# Patient Record
Sex: FEMALE | Race: WHITE | URBAN - METROPOLITAN AREA
[De-identification: names, ages, dates, MRNs, and addresses within clinical notes are randomized per-mention and may not be internally consistent; named-entity substitution may affect disease eponyms.]

---

## 2017-04-17 ENCOUNTER — HOSPITAL ENCOUNTER (OUTPATIENT)
Facility: CLINIC | Age: 72
Discharge: HOME | End: 2017-04-17
Attending: OPHTHALMOLOGY | Admitting: OPHTHALMOLOGY
Payer: MEDICARE

## 2017-04-17 ENCOUNTER — ANESTHESIA EVENT (OUTPATIENT)
Dept: SURGERY | Facility: CLINIC | Age: 72
End: 2017-04-17
Payer: MEDICARE

## 2017-04-17 ENCOUNTER — ANESTHESIA (OUTPATIENT)
Dept: SURGERY | Facility: CLINIC | Age: 72
End: 2017-04-17
Payer: MEDICARE

## 2017-04-17 VITALS
RESPIRATION RATE: 14 BRPM | DIASTOLIC BLOOD PRESSURE: 65 MMHG | WEIGHT: 165 LBS | HEIGHT: 65 IN | OXYGEN SATURATION: 99 % | SYSTOLIC BLOOD PRESSURE: 108 MMHG | BODY MASS INDEX: 27.49 KG/M2 | TEMPERATURE: 98.1 F

## 2017-04-17 DIAGNOSIS — H02.834 DERMATOCHALASIS OF BOTH UPPER EYELIDS: Primary | ICD-10-CM

## 2017-04-17 DIAGNOSIS — H02.831 DERMATOCHALASIS OF BOTH UPPER EYELIDS: Primary | ICD-10-CM

## 2017-04-17 PROCEDURE — 71000028 ZZH EYE RECOVERY PHASE 2 EACH 15 MINS: Performed by: OPHTHALMOLOGY

## 2017-04-17 PROCEDURE — 37000009 ZZH ANESTHESIA TECHNICAL FEE, EACH ADDTL 15 MIN: Performed by: OPHTHALMOLOGY

## 2017-04-17 PROCEDURE — 25000125 ZZHC RX 250: Performed by: OPHTHALMOLOGY

## 2017-04-17 PROCEDURE — 25000128 H RX IP 250 OP 636: Performed by: NURSE ANESTHETIST, CERTIFIED REGISTERED

## 2017-04-17 PROCEDURE — 36000101 ZZH EYE SURGERY LEVEL 3 1ST 30 MIN: Performed by: OPHTHALMOLOGY

## 2017-04-17 PROCEDURE — 36000102 ZZH EYE SURGERY LEVEL 3 EA 15 ADDTL MIN: Performed by: OPHTHALMOLOGY

## 2017-04-17 PROCEDURE — 25800025 ZZH RX 258: Performed by: ANESTHESIOLOGY

## 2017-04-17 PROCEDURE — 40000170 ZZH STATISTIC PRE-PROCEDURE ASSESSMENT II: Performed by: OPHTHALMOLOGY

## 2017-04-17 PROCEDURE — 27210794 ZZH OR GENERAL SUPPLY STERILE: Performed by: OPHTHALMOLOGY

## 2017-04-17 PROCEDURE — 37000008 ZZH ANESTHESIA TECHNICAL FEE, 1ST 30 MIN: Performed by: OPHTHALMOLOGY

## 2017-04-17 PROCEDURE — 25000125 ZZHC RX 250: Performed by: ANESTHESIOLOGY

## 2017-04-17 PROCEDURE — 25000132 ZZH RX MED GY IP 250 OP 250 PS 637: Mod: GY | Performed by: OPHTHALMOLOGY

## 2017-04-17 PROCEDURE — 25000125 ZZHC RX 250: Performed by: NURSE ANESTHETIST, CERTIFIED REGISTERED

## 2017-04-17 RX ORDER — TETRACAINE HYDROCHLORIDE 5 MG/ML
SOLUTION OPHTHALMIC PRN
Status: DISCONTINUED | OUTPATIENT
Start: 2017-04-17 | End: 2017-04-17 | Stop reason: HOSPADM

## 2017-04-17 RX ORDER — ONDANSETRON 2 MG/ML
INJECTION INTRAMUSCULAR; INTRAVENOUS PRN
Status: DISCONTINUED | OUTPATIENT
Start: 2017-04-17 | End: 2017-04-17

## 2017-04-17 RX ORDER — HYDROCODONE BITARTRATE AND ACETAMINOPHEN 5; 325 MG/1; MG/1
1 TABLET ORAL EVERY 6 HOURS PRN
Qty: 15 TABLET | Refills: 0 | Status: SHIPPED | OUTPATIENT
Start: 2017-04-17

## 2017-04-17 RX ORDER — ERYTHROMYCIN 5 MG/G
OINTMENT OPHTHALMIC PRN
Status: DISCONTINUED | OUTPATIENT
Start: 2017-04-17 | End: 2017-04-17 | Stop reason: HOSPADM

## 2017-04-17 RX ORDER — FENTANYL CITRATE 50 UG/ML
INJECTION, SOLUTION INTRAMUSCULAR; INTRAVENOUS PRN
Status: DISCONTINUED | OUTPATIENT
Start: 2017-04-17 | End: 2017-04-17

## 2017-04-17 RX ORDER — PROPOFOL 10 MG/ML
INJECTION, EMULSION INTRAVENOUS PRN
Status: DISCONTINUED | OUTPATIENT
Start: 2017-04-17 | End: 2017-04-17

## 2017-04-17 RX ORDER — ERYTHROMYCIN 5 MG/G
OINTMENT OPHTHALMIC
Qty: 1 G | Refills: 1 | Status: SHIPPED | OUTPATIENT
Start: 2017-04-17

## 2017-04-17 RX ORDER — SODIUM CHLORIDE, SODIUM LACTATE, POTASSIUM CHLORIDE, CALCIUM CHLORIDE 600; 310; 30; 20 MG/100ML; MG/100ML; MG/100ML; MG/100ML
INJECTION, SOLUTION INTRAVENOUS CONTINUOUS
Status: DISCONTINUED | OUTPATIENT
Start: 2017-04-17 | End: 2017-04-17 | Stop reason: HOSPADM

## 2017-04-17 RX ORDER — LIDOCAINE HYDROCHLORIDE 20 MG/ML
INJECTION, SOLUTION INFILTRATION; PERINEURAL PRN
Status: DISCONTINUED | OUTPATIENT
Start: 2017-04-17 | End: 2017-04-17

## 2017-04-17 RX ADMIN — FENTANYL CITRATE 25 MCG: 50 INJECTION, SOLUTION INTRAMUSCULAR; INTRAVENOUS at 10:06

## 2017-04-17 RX ADMIN — DEXMEDETOMIDINE 4 MCG: 100 INJECTION, SOLUTION, CONCENTRATE INTRAVENOUS at 10:11

## 2017-04-17 RX ADMIN — DEXMEDETOMIDINE 4 MCG: 100 INJECTION, SOLUTION, CONCENTRATE INTRAVENOUS at 10:06

## 2017-04-17 RX ADMIN — PROPOFOL 10 MG: 10 INJECTION, EMULSION INTRAVENOUS at 10:24

## 2017-04-17 RX ADMIN — PROPOFOL 20 MG: 10 INJECTION, EMULSION INTRAVENOUS at 10:22

## 2017-04-17 RX ADMIN — ONDANSETRON 4 MG: 2 INJECTION INTRAMUSCULAR; INTRAVENOUS at 10:09

## 2017-04-17 RX ADMIN — LIDOCAINE HYDROCHLORIDE 60 MG: 20 INJECTION, SOLUTION INFILTRATION; PERINEURAL at 10:21

## 2017-04-17 RX ADMIN — LIDOCAINE HYDROCHLORIDE 1 ML: 10 INJECTION, SOLUTION EPIDURAL; INFILTRATION; INTRACAUDAL; PERINEURAL at 09:24

## 2017-04-17 RX ADMIN — PROPOFOL 10 MG: 10 INJECTION, EMULSION INTRAVENOUS at 10:23

## 2017-04-17 RX ADMIN — PROPOFOL 30 MG: 10 INJECTION, EMULSION INTRAVENOUS at 10:21

## 2017-04-17 RX ADMIN — MIDAZOLAM HYDROCHLORIDE 1 MG: 1 INJECTION, SOLUTION INTRAMUSCULAR; INTRAVENOUS at 10:04

## 2017-04-17 RX ADMIN — SODIUM CHLORIDE, POTASSIUM CHLORIDE, SODIUM LACTATE AND CALCIUM CHLORIDE: 600; 310; 30; 20 INJECTION, SOLUTION INTRAVENOUS at 09:24

## 2017-04-17 ASSESSMENT — ENCOUNTER SYMPTOMS: SEIZURES: 0

## 2017-04-17 NOTE — IP AVS SNAPSHOT
[facility]    [address]    [place] [address]    Phone:  [phone]    Fax:  [phone]                                       After Visit Summary   4/17/2017    Aaliyah Vizcarra    MRN: [id number]           After Visit Summary Signature Page     I have received my discharge instructions, and my questions have been answered. I have discussed any challenges I see with this plan with the nurse or doctor.    ..........................................................................................................................................  Patient/Patient Representative Signature      ..........................................................................................................................................  Patient Representative Print Name and Relationship to Patient    ..................................................               ................................................  Date                                            Time    ..........................................................................................................................................  Reviewed by Signature/Title    ...................................................              ..............................................  Date                                                            Time

## 2017-04-17 NOTE — IP AVS SNAPSHOT
MRN:[id number]                      After Visit Summary   4/17/2017    Aaliyah Vizcarra    MRN: [id number]           Thank you!     Thank you for choosing [facility] for your care. Our goal is always to provide you with excellent care. Hearing back from our patients is one way we can continue to improve our services. Please take a few minutes to complete the written survey that you may receive in the mail after you visit with us. Thank you!        Patient Information     Date Of Birth          1945        About your hospital stay     You were admitted on:  April 17, 2017 You last received care in the:  [facility]    You were discharged on:  April 17, 2017       Who to Call     For medical emergencies, please call 911.  For non-urgent questions about your medical care, please call your primary care provider or clinic, [phone]  For questions related to your surgery, please call your surgery clinic        Attending Provider     Provider Specialty    Nish Tristan MD Ophthalmology       Primary Care Provider Office Phone # Fax #    [facility] [phone] [address] [facility] [place] [address]        Further instructions from your care team         POST-OPERATIVE CARE FOLLOWING EYELID SURGERY  DR. Nish Tristan  [facility]  [phone]  ICE COMPRESSES:  Immediately following surgery, you should begin to apply ice compresses.  A good method is to use a clean washcloth soaked in a bowl of ice water.  Wring it out and gently lay across your eyes.  Do this as much as you can today for 15 minutes each episode, but not while sleeping.  Then continue cold compresses 6x/day for the first four days after surgery, or longer if swelling persists.    OINTMENT:  Apply ERYTHROMYCIN OINTMENT to the surgical area (along the sutures if present) 3 TIMES PER DAY FOR 1 WEEK.  If you get ointment in your eye, it will blur your vision  slightly, but will not harm you eye.  If you run out of antibiotic ointment use Vaseline instead until the wounds are fully healed.  Tonight only 4/17, instill erythromycin ointment in your eye to lubricate your eyes  ACTIVITY:  You may resume regular activities as tolerated.  You may shower and wash your hair on the day after surgery, be careful to avoid getting shampoo in your eyes. Sleeping with your head elevated will help with swelling. You are allowed to bend.    MEDICATION:  Some discomfort and tenderness may be noticed around the eye.  If the Dr. has prescribed pain medication, please take it according to the directions on the bottle.  If you are not prescribed pain medication or you feel you do not need it, you may take Extra Strength Tylenol for mild pain. Wait after 24 hrs if you want to take Ibuprofen.  for severe pain take Norco as prescribed 1 tablet every 6 hrs.    WHAT TO EXPECT:  You should expect some slight oozing of blood from the incision site over the first two to three days after surgery.  Swelling and bruising will occur for one to two weeks or longer.  You may also experience itching and tearing during the first several weeks after surgery.  This is part of the normal healing process.  APPOINTMENT :  Make and appointment for next week   CALL THE DOCTOR S OFFICE WITH ANY QUESTIONS OR CONCERNS, [phone].  Please call if you suspect your wound has opened, you have a large / growing bruise (hematoma), vision loss not attributable to the ointment, or bleeding that does not seem to be stopping.    [facility] Discharge  Instructions  Anesthesia (Eye Care Center)   Adult Discharge Instructions    For 24 hours after surgery    1. Get plenty of rest.  Make arrangements to have a responsible adult stay with you for at least 6 hours after you leave the hospital.  2. Do not drive or use heavy equipment for 24 hours.    3. Do not drink alcohol for 24 hours.  4. Do  "not sign legal documents or make important decisions for 24 hours.  5. Avoid strenuous or risky activities. You may feel lightheaded.  If so, sit for a few minutes before standing.  Have someone help you get up.   6. Conscious sedation patients may resume a regular diet..  7. Any questions of medical nature, call your physician.      Pending Results     No orders found from 4/15/2017 to 2017.            Admission Information     Date & Time Provider Department Dept. Phone    2017 [place], Nish LANG MD [facility] [phone]      Your Vitals Were     Blood Pressure Temperature Respirations Height Weight Pulse Oximetry    112/75 98.1  F (36.7  C) (Temporal) 16 1.645 m (5' 4.76\") 74.8 kg (165 lb) 99%    BMI (Body Mass Index)                   27.66 kg/m2           MyChart Information     [employer] lets you send messages to your doctor, view your test results, renew your prescriptions, schedule appointments and more. To sign up, go to [email or web address] . Click on \"Log in\" on the left side of the screen, which will take you to the Welcome page. Then click on \"Sign up Now\" on the right side of the page.     You will be asked to enter the access code listed below, as well as some personal information. Please follow the directions to create your username and password.     Your access code is: [email or web address]  Expires: 2017 11:34 AM     Your access code will  in 90 days. If you need help or a new code, please call your [place] clinic or [phone].        Care EveryWhere ID     This is your Care EveryWhere ID. This could be used by other organizations to access your [place] medical records  [id number]           Review of your medicines      START taking        Dose / Directions    erythromycin ophthalmic ointment   Commonly known as:  ROMYCIN   Used for:  Dermatochalasis of both upper eyelids        Apply to operative eyelid(s) three times daily, in eye(s) at bedtime   Quantity: "  1 g   Refills:  1       HYDROcodone-acetaminophen 5-325 MG per tablet   Commonly known as:  NORCO   Used for:  Dermatochalasis of both upper eyelids        Dose:  1 tablet   Take 1 tablet by mouth every 6 hours as needed for moderate to severe pain   Quantity:  15 tablet   Refills:  0         CONTINUE these medicines which have NOT CHANGED        Dose / Directions    albuterol 108 (90 BASE) MCG/ACT Inhaler   Generic drug:  albuterol        Dose:  2 puff   Inhale 2 puffs into the lungs every 6 hours as needed.   Refills:  0       CALCIUM CARBONATE PO        Dose:  500 mg   Take 500 mg by mouth daily oystershell   Refills:  0       FLOVENT IN        Inhale  into the lungs.   Refills:  0       LISINOPRIL PO        Dose:  5 mg   Take 5 mg by mouth daily   Refills:  0       MULTIVITAMIN PO        Take  by mouth.   Refills:  0       ranitidine 150 MG capsule   Commonly known as:  ZANTAC        Dose:  150 mg   Take 150 mg by mouth 2 times daily   Refills:  0       VITAMIN D (CHOLECALCIFEROL) PO        Dose:  1000 Units   Take 1,000 Units by mouth daily   Refills:  0            Where to get your medicines      These medications were sent to [facility] - [address]  [address], [place] [address]     Phone:  [phone]     erythromycin ophthalmic ointment         Some of these will need a paper prescription and others can be bought over the counter. Ask your nurse if you have questions.     Bring a paper prescription for each of these medications     HYDROcodone-acetaminophen 5-325 MG per tablet                Protect others around you: Learn how to safely use, store and throw away your medicines at www.disposemymeds.org.             Medication List: This is a list of all your medications and when to take them. Check marks below indicate your daily home schedule. Keep this list as a reference.      Medications           Morning Afternoon Evening Bedtime As Needed     albuterol 108 (90 BASE) MCG/ACT Inhaler   Inhale 2 puffs into the lungs every 6 hours as needed.   Generic drug:  albuterol                                CALCIUM CARBONATE PO   Take 500 mg by mouth daily rita                                erythromycin ophthalmic ointment   Commonly known as:  ROMYCIN   Apply to operative eyelid(s) three times daily, in eye(s) at bedtime   Last time this was given:  1 g on 4/17/2017 11:36 AM                                FLOVENT IN   Inhale  into the lungs.                                HYDROcodone-acetaminophen 5-325 MG per tablet   Commonly known as:  NORCO   Take 1 tablet by mouth every 6 hours as needed for moderate to severe pain                                LISINOPRIL PO   Take 5 mg by mouth daily                                MULTIVITAMIN PO   Take  by mouth.                                ranitidine 150 MG capsule   Commonly known as:  ZANTAC   Take 150 mg by mouth 2 times daily                                VITAMIN D (CHOLECALCIFEROL) PO   Take 1,000 Units by mouth daily

## 2017-04-17 NOTE — ANESTHESIA POSTPROCEDURE EVALUATION
Patient: Aaliyah Vizcarra    Procedure(s):  BILATERAL BLEPHAROPLASTY WITH BROW PTOSIS REPAIR  - Wound Class: I-Clean    Diagnosis:PTOSIS   Diagnosis Additional Information: No value filed.    Anesthesia Type:  MAC    Note:  Anesthesia Post Evaluation    Patient location during evaluation: bedside  Patient participation: Able to fully participate in evaluation  Level of consciousness: awake  Pain management: adequate  Airway patency: patent  Cardiovascular status: acceptable  Respiratory status: acceptable  Hydration status: acceptable  PONV: none     Anesthetic complications: None    Comments: No anesthetic complications noted.         Last vitals:  Vitals:    04/17/17 0922 04/17/17 1138 04/17/17 1239   BP: 129/73 112/75 108/65   Resp: 16 16 14   Temp: 36.7  C (98.1  F)     SpO2: 99% 99% 99%         Electronically Signed By: Howie Blanco DO, DO  April 17, 2017  5:02 PM

## 2017-04-17 NOTE — DISCHARGE INSTRUCTIONS
POST-OPERATIVE CARE FOLLOWING EYELID SURGERY  DR. Nish Tristan  [facility]  [phone]  ICE COMPRESSES:  Immediately following surgery, you should begin to apply ice compresses.  A good method is to use a clean washcloth soaked in a bowl of ice water.  Wring it out and gently lay across your eyes.  Do this as much as you can today for 15 minutes each episode, but not while sleeping.  Then continue cold compresses 6x/day for the first four days after surgery, or longer if swelling persists.    OINTMENT:  Apply ERYTHROMYCIN OINTMENT to the surgical area (along the sutures if present) 3 TIMES PER DAY FOR 1 WEEK.  If you get ointment in your eye, it will blur your vision slightly, but will not harm you eye.  If you run out of antibiotic ointment use Vaseline instead until the wounds are fully healed.  Tonight only 4/17, instill erythromycin ointment in your eye to lubricate your eyes  ACTIVITY:  You may resume regular activities as tolerated.  You may shower and wash your hair on the day after surgery, be careful to avoid getting shampoo in your eyes. Sleeping with your head elevated will help with swelling. You are allowed to bend.    MEDICATION:  Some discomfort and tenderness may be noticed around the eye.  If the DrEthan has prescribed pain medication, please take it according to the directions on the bottle.  If you are not prescribed pain medication or you feel you do not need it, you may take Extra Strength Tylenol for mild pain. Wait after 24 hrs if you want to take Ibuprofen.  for severe pain take Norco as prescribed 1 tablet every 6 hrs.    WHAT TO EXPECT:  You should expect some slight oozing of blood from the incision site over the first two to three days after surgery.  Swelling and bruising will occur for one to two weeks or longer.  You may also experience itching and tearing during the first several weeks after surgery.  This is part of the normal healing process.  APPOINTMENT :  Make and  appointment for next week   CALL THE DOCTOR S OFFICE WITH ANY QUESTIONS OR CONCERNS, [phone].  Please call if you suspect your wound has opened, you have a large / growing bruise (hematoma), vision loss not attributable to the ointment, or bleeding that does not seem to be stopping.    [facility] Discharge  Instructions  Anesthesia (Eye Care Center)   Adult Discharge Instructions    For 24 hours after surgery    1. Get plenty of rest.  Make arrangements to have a responsible adult stay with you for at least 6 hours after you leave the hospital.  2. Do not drive or use heavy equipment for 24 hours.    3. Do not drink alcohol for 24 hours.  4. Do not sign legal documents or make important decisions for 24 hours.  5. Avoid strenuous or risky activities. You may feel lightheaded.  If so, sit for a few minutes before standing.  Have someone help you get up.   6. Conscious sedation patients may resume a regular diet..  7. Any questions of medical nature, call your physician.

## 2017-04-17 NOTE — ANESTHESIA CARE TRANSFER NOTE
Patient: Aaliyah Vizcarra    Procedure(s):  BILATERAL BLEPHAROPLASTY WITH BROW PTOSIS REPAIR  - Wound Class: I-Clean    Diagnosis: PTOSIS   Diagnosis Additional Information: No value filed.    Anesthesia Type:   MAC     Note:  Airway :Room Air  Patient transferred to:PACU  Comments: Pt to PACU on room air, airway patent, VSS.  Report to RN.      Vitals: (Last set prior to Anesthesia Care Transfer)    CRNA VITALS  4/17/2017 1105 - 4/17/2017 1138      4/17/2017             SpO2: 98 %    Resp Rate (set): 10                Electronically Signed By: MARIIA Piña CRNA  April 17, 2017  11:38 AM

## 2017-04-17 NOTE — OP NOTE
PREOPERATIVE DIAGNOSIS: Bilateral brow ptosis, Bilateral upper lid dermatochalasis, visually significant.   POSTOPERATIVE DIAGNOSIS: Same  PROCEDURE PERFORMED: Bilateral direct brow lift, Bilateral upper lid blepharoplasty.   INDICATIONS FOR PROCEDURE:  Aaliyah Vizcarra presented to the eye clinic with decreased vision secondary to drooping of the eyebrows with the brow drooping below the margin of the superior orbital rim.  In addtion, upper eyelid skin was drooping which was visually significant based on visual field testing demonstrating improved superior peripheral vision with taped elevation of the eyelids and brows.  The risks, benefits and alternatives to bilateral upper lid blepharoplasty with brow ptosis repair were discussed with the patient. All pateint questions were answered. They understood and elected to proceed.   DESCRIPTION OF PROCEDURE: The patient was brought to the operating suite where they were prepped and draped in sterile fashion. Local anesthetic compromised of a 50:50 mixture of 0.5% Marcaine and 2% lidocaine with epinephrine was injected into the subcutaneous tissue of the brow as well as the eyelid.  Brow incisions were marked in a crescent shape and where about 7 mm in maximum vertical dimension - inscisions encompassed the lateral third of the brow and the inferior incision was just superior to the brow.  Incisions were created using the feather blade.  The flap was excised using the sergio scissors.  Bleeding vessels were cauterized using the hot-wire cautery.  Incisions were then closed in two layers.  The deep layer was closed with 5-0 interrupted vicryl sutures and the superficial skin was closed using 6-0 fast gut in a running fashion.  Lid incisions were marked approximately 9 mm superior to the lash line, and 13 mm inferior to the inferior brow hairs. A pinch technique was used to ensure that sufficient anterior lamellar tissue would be present at the completion of the  procedure.  Incisions were created using the feather blade. The skin muscle flap was undermined using the Marquez tenotomy scissors. This flap was then excised using a combination of the Bhavna scissors and hot wire cautery. All bleeders were cauterized. The incisions were then closed using a 6-0 fast-absorbing gut suture in a running fashion.  Erythromycin ointment was applied to the wounds  ESTIMATED BLOOD LOSS: 5 cc   COMPLICATIONS: None.   DISPOSITION: The patient will be discharged to home, and will follow up in 1 week in the eye clinic.

## 2017-04-17 NOTE — ANESTHESIA PREPROCEDURE EVALUATION
Anesthesia Evaluation     . Pt has had prior anesthetic.     No history of anesthetic complications          ROS/MED HX    ENT/Pulmonary:     (+)Mild Persistent asthma Treatment: Inhaler prn,  , . .   (-) sleep apnea   Neurologic:      (-) seizures and CVA   Cardiovascular:        (-) hypertension and CAD   METS/Exercise Tolerance:     Hematologic:         Musculoskeletal:         GI/Hepatic:     (+) GERD Asymptomatic on medication,      (-) liver disease   Renal/Genitourinary:      (-) renal disease   Endo:      (-) Type II DM and thyroid disease   Psychiatric:         Infectious Disease:        (-) Recent Fever   Malignancy:   (+) Malignancy History of Breast  Breast CA status post Radiation and Chemo.         Other:                     Physical Exam  Normal systems: cardiovascular, pulmonary and dental    Airway   Mallampati: II  TM distance: >3 FB  Neck ROM: full    Dental     Cardiovascular       Pulmonary                     Anesthesia Plan      History & Physical Review  History and physical reviewed and following examination; no interval change.    ASA Status:  2 .    NPO Status:  > 8 hours    Plan for MAC Reason for MAC:  Procedure to face, neck, head or breast  PONV prophylaxis:  Ondansetron (or other 5HT-3)       Postoperative Care      Consents  Anesthetic plan, risks, benefits and alternatives discussed with:  Patient..                          .

## 2018-11-21 NOTE — PROGRESS NOTES
"Pharmacist called regarding patient's listed allergy and patient being prescribed Erythromycin.  Per patient she had a severe reaction to \" Aureomycin\"- ( Patient and  not sure of spelling. Patient had been given erythromycin ointment in OR and as of writing, patient  exhibits no side effects.  Will observe patient for 20  more minutes.  "
No untoward reaction noted from Erythromycin. Printed and verbal instructions given to patient and family.  Patient/Family verbalized understanding. No knowledge deficit noted. Eye ointment given to patient/care taker. Belongings returned to patient/family. Patient assisted to wheel chair by author, taken to hospital door and was discharged to home.  
Surgeon at bedside talking to patient and family () about surgery's outcome.  Instruction given by surgeon regarding ointment and clinic appointment and activity restriction.       
15-Oct-2017

## (undated) DEVICE — GLOVE PROTEXIS MICRO 6.5  2D73PM65

## (undated) DEVICE — SU VICRYL 5-0 S-14DA 18" J571G

## (undated) DEVICE — DRAPE TOWEL IMPERVIOUS X2 7553

## (undated) DEVICE — GLOVE PROTEXIS POWDER FREE SMT 7.5  2D72PT75X

## (undated) DEVICE — ESU EYE HIGH TEMP 65410-183

## (undated) DEVICE — LINEN TOWEL PACK X5 5464

## (undated) DEVICE — SU PLAIN FAST ABSORB 6-0 PC-1 18" 1916G

## (undated) DEVICE — SOL WATER IRRIG 1000ML BOTTLE 2F7114

## (undated) DEVICE — PACK OCULOPLATIC SEN15OCFSD

## (undated) DEVICE — SYR 05ML SLIP TIP W/O NDL

## (undated) RX ORDER — LIDOCAINE HCL/EPINEPHRINE/PF 2%-1:200K
VIAL (ML) INJECTION
Status: DISPENSED
Start: 2017-04-17

## (undated) RX ORDER — ERYTHROMYCIN 5 MG/G
OINTMENT OPHTHALMIC
Status: DISPENSED
Start: 2017-04-17

## (undated) RX ORDER — FENTANYL CITRATE 50 UG/ML
INJECTION, SOLUTION INTRAMUSCULAR; INTRAVENOUS
Status: DISPENSED
Start: 2017-04-17

## (undated) RX ORDER — ONDANSETRON 2 MG/ML
INJECTION INTRAMUSCULAR; INTRAVENOUS
Status: DISPENSED
Start: 2017-04-17

## (undated) RX ORDER — TETRACAINE HYDROCHLORIDE 5 MG/ML
SOLUTION OPHTHALMIC
Status: DISPENSED
Start: 2017-04-17

## (undated) RX ORDER — BUPIVACAINE HYDROCHLORIDE AND EPINEPHRINE 5; 5 MG/ML; UG/ML
INJECTION, SOLUTION EPIDURAL; INTRACAUDAL; PERINEURAL
Status: DISPENSED
Start: 2017-04-17